# Patient Record
Sex: FEMALE | Race: ASIAN | NOT HISPANIC OR LATINO | Employment: UNEMPLOYED | URBAN - METROPOLITAN AREA
[De-identification: names, ages, dates, MRNs, and addresses within clinical notes are randomized per-mention and may not be internally consistent; named-entity substitution may affect disease eponyms.]

---

## 2017-04-13 ENCOUNTER — LAB REQUISITION (OUTPATIENT)
Dept: LAB | Facility: HOSPITAL | Age: 47
End: 2017-04-13

## 2017-04-13 ENCOUNTER — ALLSCRIPTS OFFICE VISIT (OUTPATIENT)
Dept: OTHER | Facility: OTHER | Age: 47
End: 2017-04-13

## 2017-04-13 DIAGNOSIS — Z01.419 ENCOUNTER FOR GYNECOLOGICAL EXAMINATION WITHOUT ABNORMAL FINDING: ICD-10-CM

## 2017-04-13 PROCEDURE — G0145 SCR C/V CYTO,THINLAYER,RESCR: HCPCS | Performed by: OBSTETRICS & GYNECOLOGY

## 2017-04-13 PROCEDURE — 87624 HPV HI-RISK TYP POOLED RSLT: CPT | Performed by: OBSTETRICS & GYNECOLOGY

## 2017-04-19 LAB
HPV RRNA GENITAL QL NAA+PROBE: NORMAL
LAB AP GYN PRIMARY INTERPRETATION: NORMAL
Lab: NORMAL

## 2017-05-15 ENCOUNTER — TRANSCRIBE ORDERS (OUTPATIENT)
Dept: ADMINISTRATIVE | Facility: HOSPITAL | Age: 47
End: 2017-05-15

## 2017-05-15 DIAGNOSIS — Z12.31 VISIT FOR SCREENING MAMMOGRAM: Primary | ICD-10-CM

## 2017-05-22 ENCOUNTER — HOSPITAL ENCOUNTER (OUTPATIENT)
Dept: MAMMOGRAPHY | Facility: HOSPITAL | Age: 47
Discharge: HOME | End: 2017-05-22

## 2017-05-22 DIAGNOSIS — Z01.419 ENCOUNTER FOR GYNECOLOGICAL EXAMINATION WITHOUT ABNORMAL FINDING: ICD-10-CM

## 2017-05-22 PROCEDURE — G0202 SCR MAMMO BI INCL CAD: HCPCS

## 2017-07-25 ENCOUNTER — APPOINTMENT (OUTPATIENT)
Dept: LAB | Facility: HOSPITAL | Age: 47
End: 2017-07-25
Attending: INTERNAL MEDICINE
Payer: COMMERCIAL

## 2017-07-25 ENCOUNTER — TRANSCRIBE ORDERS (OUTPATIENT)
Dept: LAB | Facility: HOSPITAL | Age: 47
End: 2017-07-25

## 2017-07-25 DIAGNOSIS — E03.9 UNSPECIFIED HYPOTHYROIDISM: Primary | ICD-10-CM

## 2017-07-25 LAB
T3FREE SERPL-MCNC: 1.73 PG/ML (ref 2.3–4.2)
T4 FREE SERPL-MCNC: 1.15 NG/DL (ref 0.76–1.46)
TSH SERPL DL<=0.05 MIU/L-ACNC: 2.37 UIU/ML (ref 0.36–3.74)

## 2017-07-25 PROCEDURE — 36415 COLL VENOUS BLD VENIPUNCTURE: CPT

## 2017-07-25 PROCEDURE — 84443 ASSAY THYROID STIM HORMONE: CPT

## 2017-07-25 PROCEDURE — 84439 ASSAY OF FREE THYROXINE: CPT

## 2017-07-25 PROCEDURE — 84481 FREE ASSAY (FT-3): CPT

## 2018-01-14 VITALS
SYSTOLIC BLOOD PRESSURE: 103 MMHG | WEIGHT: 190 LBS | BODY MASS INDEX: 32.44 KG/M2 | HEIGHT: 64 IN | DIASTOLIC BLOOD PRESSURE: 69 MMHG

## 2019-10-28 ENCOUNTER — HOSPITAL ENCOUNTER (EMERGENCY)
Facility: HOSPITAL | Age: 49
Discharge: HOME | End: 2019-10-28
Attending: EMERGENCY MEDICINE | Admitting: EMERGENCY MEDICINE

## 2019-10-28 ENCOUNTER — APPOINTMENT (EMERGENCY)
Dept: RADIOLOGY | Facility: HOSPITAL | Age: 49
End: 2019-10-28

## 2019-10-28 VITALS
HEART RATE: 60 BPM | RESPIRATION RATE: 16 BRPM | OXYGEN SATURATION: 100 % | SYSTOLIC BLOOD PRESSURE: 140 MMHG | BODY MASS INDEX: 29.47 KG/M2 | DIASTOLIC BLOOD PRESSURE: 73 MMHG | WEIGHT: 169 LBS | TEMPERATURE: 97.4 F

## 2019-10-28 DIAGNOSIS — R51.9 HEADACHE: Primary | ICD-10-CM

## 2019-10-28 PROCEDURE — 99283 EMERGENCY DEPT VISIT LOW MDM: CPT

## 2019-10-28 PROCEDURE — 70450 CT HEAD/BRAIN W/O DYE: CPT

## 2019-10-28 RX ORDER — LEVOTHYROXINE SODIUM 0.12 MG/1
125 TABLET ORAL DAILY
COMMUNITY

## 2019-10-28 RX ORDER — KETOROLAC TROMETHAMINE 30 MG/ML
15 INJECTION, SOLUTION INTRAMUSCULAR; INTRAVENOUS ONCE
Status: DISCONTINUED | OUTPATIENT
Start: 2019-10-28 | End: 2019-10-28 | Stop reason: HOSPADM

## 2019-10-28 RX ORDER — BUTALBITAL, ACETAMINOPHEN AND CAFFEINE 50; 325; 40 MG/1; MG/1; MG/1
1 TABLET ORAL EVERY 4 HOURS PRN
Qty: 12 TABLET | Refills: 0 | Status: SHIPPED | OUTPATIENT
Start: 2019-10-28

## 2019-10-28 RX ORDER — NAPROXEN 500 MG/1
500 TABLET ORAL ONCE
Status: COMPLETED | OUTPATIENT
Start: 2019-10-28 | End: 2019-10-28

## 2019-10-28 RX ORDER — METOCLOPRAMIDE 10 MG/1
10 TABLET ORAL ONCE
Status: COMPLETED | OUTPATIENT
Start: 2019-10-28 | End: 2019-10-28

## 2019-10-28 RX ORDER — DIPHENHYDRAMINE HCL 25 MG
25 TABLET ORAL ONCE
Status: COMPLETED | OUTPATIENT
Start: 2019-10-28 | End: 2019-10-28

## 2019-10-28 RX ADMIN — METOCLOPRAMIDE HYDROCHLORIDE 10 MG: 10 TABLET ORAL at 13:31

## 2019-10-28 RX ADMIN — DIPHENHYDRAMINE HCL 25 MG: 25 TABLET, COATED ORAL at 13:31

## 2019-10-28 RX ADMIN — NAPROXEN 500 MG: 500 TABLET ORAL at 13:43

## 2019-10-28 NOTE — ED PROVIDER NOTES
History  Chief Complaint   Patient presents with    Headache     headache since yesterday, worse with bright lights  no hx of headaches  denies any visual changes or nausea, c/o pressure in ears     HPI    44 year female that presents with headaches  Patient states since yesterday she has been having headaches  Mild photophobia and phonophobia  States she has had minor headaches in the before but nothing this bad  Describes it as pressure  Denies any her head  No trauma  Denies any visual changes no nausea vomiting  Denies any fevers or chills  Patient states she took Tylenol with no relief  44 year female that presents today with headaches  I offered patient to get a CT scan but she refused states she cannot states this long  She has kids at home  I discussed about why getting a CT scan to rule out any bleed versus mass etc but she refused  States she only wants p o  Medications and wants to go home  Will try migraine cocktail  Prior to Admission Medications   Prescriptions Last Dose Informant Patient Reported? Taking?   levothyroxine 125 mcg tablet 10/28/2019 at Unknown time  Yes Yes   Sig: Take 125 mcg by mouth daily      Facility-Administered Medications: None       Past Medical History:   Diagnosis Date    Disease of thyroid gland        Past Surgical History:   Procedure Laterality Date     SECTION         History reviewed  No pertinent family history  I have reviewed and agree with the history as documented  Social History     Tobacco Use    Smoking status: Never Smoker    Smokeless tobacco: Never Used   Substance Use Topics    Alcohol use: Not Currently    Drug use: Never        Review of Systems   Constitutional: Negative  Negative for diaphoresis and fever  HENT: Negative  Eyes: Positive for photophobia  Respiratory: Negative  Negative for cough, shortness of breath and wheezing  Cardiovascular: Negative    Negative for chest pain, palpitations and leg swelling  Gastrointestinal: Negative for abdominal distention, abdominal pain, nausea and vomiting  Genitourinary: Negative  Musculoskeletal: Negative  Skin: Negative  Neurological: Positive for headaches  Psychiatric/Behavioral: Negative  All other systems reviewed and are negative  Physical Exam  Physical Exam   Constitutional: She is oriented to person, place, and time  She appears well-developed and well-nourished  HENT:   Head: Normocephalic and atraumatic  Eyes: Pupils are equal, round, and reactive to light  EOM are normal    Neck: Normal range of motion  Neck supple  Cardiovascular: Normal rate, regular rhythm and normal heart sounds  No murmur heard  Pulmonary/Chest: Effort normal and breath sounds normal    Abdominal: Soft  Bowel sounds are normal  She exhibits no distension  There is no tenderness  There is no rebound and no guarding  Musculoskeletal: Normal range of motion  Neurological: She is alert and oriented to person, place, and time  Normal neuro exam   Skin: Skin is warm and dry  Psychiatric: She has a normal mood and affect  Vitals reviewed        Vital Signs  ED Triage Vitals [10/28/19 1126]   Temperature Pulse Respirations Blood Pressure SpO2   (!) 97 4 °F (36 3 °C) 60 16 140/73 100 %      Temp Source Heart Rate Source Patient Position - Orthostatic VS BP Location FiO2 (%)   Tympanic Monitor Sitting Right arm --      Pain Score       7           Vitals:    10/28/19 1126   BP: 140/73   Pulse: 60   Patient Position - Orthostatic VS: Sitting         Visual Acuity      ED Medications  Medications   ketorolac (TORADOL) injection 15 mg (has no administration in time range)   diphenhydrAMINE (BENADRYL) tablet 25 mg (has no administration in time range)   metoclopramide (REGLAN) tablet 10 mg (has no administration in time range)       Diagnostic Studies  Results Reviewed     None                 No orders to display              Procedures  Procedures       ED Course                               MDM    Disposition  Final diagnoses:   None     ED Disposition     None      Follow-up Information    None         Patient's Medications   Discharge Prescriptions    No medications on file     No discharge procedures on file      ED Provider  Electronically Signed by           Marcello Blackman MD  10/30/19 5825

## 2019-11-06 RX ORDER — BUTALBITAL, ACETAMINOPHEN AND CAFFEINE 50; 325; 40 MG/1; MG/1; MG/1
1 TABLET ORAL EVERY 4 HOURS PRN
Qty: 12 TABLET | Refills: 0 | Status: SHIPPED | OUTPATIENT
Start: 2019-11-06

## 2023-01-11 ENCOUNTER — OFFICE VISIT (OUTPATIENT)
Dept: PHYSICAL THERAPY | Facility: CLINIC | Age: 53
End: 2023-01-11

## 2023-01-11 DIAGNOSIS — M25.561 PAIN IN BOTH KNEES, UNSPECIFIED CHRONICITY: Primary | ICD-10-CM

## 2023-01-11 DIAGNOSIS — M25.562 PAIN IN BOTH KNEES, UNSPECIFIED CHRONICITY: Primary | ICD-10-CM

## 2023-01-11 NOTE — PROGRESS NOTES
PT Evaluation   Today's date: 2023  Patient name: Rashi Hebert  : 1970  MRN: [id number]  Referring provider: No ref  provider found  Dx:   Encounter Diagnosis     ICD-10-CM    1  Pain in both knees, unspecified chronicity  M25 561     M25 562             Assessment  Assessment details: Patient is a 45 y o  Female who presents to physical therapy utilizing direct access benefits with bilateral knee pain  Patient's greatest concern is worry over not knowing what's wrong and fear of not being able to keep active  Primary movement impairment diagnosis of knee pain with motor control deficits resulting in pathoanatomical symptoms of pain, restricted range of motion, muscular power deficits, and functional limitations  The aforementioned impairments have limited the patient's ability to squats, negotiate stairs, and perform gym activities at usual intensities  Due to irritability of symptoms and objective findings, there is concern for meniscal pathology in left knee  Patient tested positive for 4/5 items in meniscal cluster during eval, as noted below  Patient does have appointment scheduled with Orthopedics on 2023  Patient will benefit from PT to address possible underlying meniscal pinching and symptom irritability  Patient provided with HEP during today's session, including hip burners, SLR, and clamshells         Primary movement impairments:  1) Motor control  2) Range of motion restriction    Etiological factors include: Patient slipped approx 1 month ago        Impairments: Abnormal coordination, Abnormal gait, Abnormal muscle tone, Abnormal or restricted ROM, Activity intolerance, Impaired balance, Impaired physical strength, Lacks appropriate HEP, Poor body mechanics, Pain with function, Weight-bearing intolerance, Abnormal movement and Abnormal muscle firing  Understanding of Dx/Px/POC: Good  Prognosis: Good   Positive prognostic factors: motivation for improvement, active lifestyle   Negative prognostic factors: irritability of symptoms, comorbid conditions    Patient verbalized understanding of POC  Please contact me if you have any questions or recommendations  Thank you for the referral and the opportunity to share in [facility] care  Plan  Patient would benefit from: PT Eval and Skilled PT  Planned modality interventions: Biofeedback, Cryotherapy, TENS, Thermotherapy: Hydrocollator Packs and Traction  Planned therapy interventions: Abdominal trunk stabilization, ADL training, Balance/WB training, Body mechanics training, Coordination, Functional ROM exercises, Gait training, HEP, Joint mobilization, Manual therapy, Gonzalez taping, Motor coordination training, Neuromuscular re-education, Patient education, Strengthening, Stretching, Therapeutic activities, Therapeutic exercises and Activity modification  Frequency: 2x/wk  Duration in weeks: 4  Plan of Care beginning date: 1/11/2023  Plan of Care expiration date: 1 month - 2/11/2023  Treatment plan discussed with: Patient       Goals  Short Term Goals (2 weeks):    - Patient will be independent in basic HEP 2-3 weeks  - Patient will report >50% reduction in pain  - Patient will demonstrate >1/3 improvement in MMT grade as applicable  - Patient will achieve full knee flexion    Long Term Goals (4 weeks):  - Patient will be independent in a comprehensive home exercise program  - Patient FOTO score will improve to >77/100  - Patient will self-report >75% improvement in function  - Patient will perform >75% squat ROM   - Patient will return to usual activities without knee pain      Subjective    History of Present Illness  - Mechanism of injury: Patient reports to physical therapy after slipping about 1 month ago and injuring left knee  She did not sustain fall at the time   Current aggravating factors including crossing legs, sittingfor >5 minutes, keeping knee bent, squatting, kneeling, rising from chair  Increased pain with stairs, descending > ascending  She states she is very active, going to the gym 4 days per week  States she has been avoiding squatting and LE activities as a result of pain  She states she initially had swelling in knee, which has improved  States pain is anteriorly  Patient states she does have history of right knee pain after sustaining fall years ago, which is still bothersome  States she occasionally feels like her knee is "going out"  Pain  - Current pain ratin/10  - At best pain ratin/10  - At worst pain ratin/10    Social Support  - Stairs in house: full flight       Objective     Sensation  - Light touch: grossly intact and equal bilaterally    LE MMT  LEFT  RIGHT  -Hip Flexion:   4/5  4/5  -Hip Extension:  4/5  4/5   -Hip Abduction: 3+/5  4-/5  -Hip Adduction: 4+/5  4+/5  -Hip IR:  4/5  4/5  -Hip ER:  4-/5  4-/5    -Knee Flexion  4/5  4+/5  -Knee Extension 4-/5  4/5    Hip Range of Motion    LEFT  RIGHT  - Flexion 105  105  - IR  30  30  - ER  45  45    Knee Range of Motion    LEFT  RIGHT  - Flexion 125  135  - Extension 0  0       Palpation  LEFT  - Positive: medial joint line  - Negative: lateral joint line, patella, quad tendon, patellar tendon    RIGHT  - Positive: N/A  - Negative: anterior joint line, lateral joint line, patella, quad tendon, patellar tendon    Diagnostic Tests Performed  LEFT  - Positive:  Nidia  - Negative: Anterior drawer, posterior drawer, knee valgus at 0/30, knee varus at 0/30     RIGHT   - Positive: N/A  - Negative: Anterior drawer, posterior drawer, knee valgus, knee varus, nidia    Functional Assessment  -Functional Squat: achieves 15% of motion before onset of bilateral knee pain, L > R; mod knee valgus bilaterally with increased pronation on left compared to right    Meniscal Cluster  - Joint line tenderness: POSITIVE  - Pain with end range flexion: POSITIVE  - Pain with end range extension: NEGATIVE  - [facility]: POSITIVE  - Clicking/catching/locking: POSITIVE                 Insurance:  AMA/CMS Eval/ Re-eval POC expires [facility] #/ Referral # Total units  Start date  Expiration date Extension  Visit limitation? PT only or  PT+OT?  Co-Insurance   SELF PAY 1/11/23 2/11/23 63                                                                         Date 1/11/23        Visit Number IE        Manual         Inferomedial patella mob w/ tibial IR/ER and TKE 2x10 each                                   Neuro Re-Ed         Clamshells Rev, HEP        Bridges         Hip burners Rev, HEP        SLR/supine TKE Rev, HEP        Step ups         Lateral stepping                                             TherEx         Bike w/u         Heel slides w/ peanut                                                      TherAct         Patient education PT POC, HEP, pt edu 10 min                                            Gait Training                                    Modalities         CP               Precautions:   Past Medical History:   Diagnosis Date   • Disease of thyroid gland

## 2023-01-18 ENCOUNTER — OFFICE VISIT (OUTPATIENT)
Dept: PHYSICAL THERAPY | Facility: CLINIC | Age: 53
End: 2023-01-18

## 2023-01-18 DIAGNOSIS — M25.561 PAIN IN BOTH KNEES, UNSPECIFIED CHRONICITY: Primary | ICD-10-CM

## 2023-01-18 DIAGNOSIS — M25.562 PAIN IN BOTH KNEES, UNSPECIFIED CHRONICITY: Primary | ICD-10-CM

## 2023-01-18 NOTE — PROGRESS NOTES
Daily Note     Today's date: 2023  Patient name: Keena Cole  : 1970  MRN: [id number]  Referring provider: No ref  provider found  Dx:   Encounter Diagnosis     ICD-10-CM    1  Pain in both knees, unspecified chronicity  M25 561     M25 562           Start Time: 1100  Stop Time: 1140  Total time in clinic (min): 40 minutes    Subjective: Patient reports improvement in anteromedial knee pain following previous session, although cont to be occasionally bothersome, particularly with rolling over in bed  States she has been getting significant quad cramping with hip burners  Objective: See treatment diary below      Assessment: Tolerated treatment well  Patient with episode of left knee catching/locking after sustained knee flexion while performing SLR on right; able to unlock/restore knee extension independently although painful  Reduction in knee pain and intensity of locking noted after medial tibial HVLA  Patient with quick fatigability and tightness developing in quads with knee extension activities, indicating muscular coordination/motor control deficits  Patient with fatigue post session  Patient will continue to benefit from skilled PT to improve functional mobility and activity tolerance  Plan: Continue per plan of care  Insurance:  AMA/CMS Eval/ Re-eval POC expires [facility] #/ Referral # Total units  Start date  Expiration date Extension  Visit limitation? PT only or  PT+OT?  Co-Insurance   SELF PAY 23 63                                                                         Date 23       Visit Number IE 2       Manual         Inferomedial patella mob w/ tibial IR/ER and TKE 2x10 each          Medial tibial HVLA, x2                         Neuro Re-Ed         Clamshells Rev, HEP 3x10 each       Bridges  3x10       Hip burners Rev, HEP x15 each       SLR/supine TKE Rev, HEP 2x10 each       Step ups  6" 2x10 each       Lateral stepping Lateral step downs  6" 2x5 each                                  TherEx         Bike w/u  8 min pre       Heel slides w/ peanut  x30       LAQ  3x10 each       Hip flexor str on chair  20" x5 each                                  TherAct         Patient education PT POC, HEP, pt edu 10 min                                            Gait Training                                    Modalities         CP               Precautions:   Past Medical History:   Diagnosis Date   • Disease of thyroid gland

## 2023-01-25 ENCOUNTER — OFFICE VISIT (OUTPATIENT)
Dept: PHYSICAL THERAPY | Facility: CLINIC | Age: 53
End: 2023-01-25

## 2023-01-25 DIAGNOSIS — M25.561 PAIN IN BOTH KNEES, UNSPECIFIED CHRONICITY: Primary | ICD-10-CM

## 2023-01-25 DIAGNOSIS — M25.562 PAIN IN BOTH KNEES, UNSPECIFIED CHRONICITY: Primary | ICD-10-CM

## 2023-01-25 NOTE — PROGRESS NOTES
Daily Note     Today's date: 2023  Patient name: Jamarcus Clements  : 1970  MRN: [id number]  Referring provider: No ref  provider found  Dx:   Encounter Diagnosis     ICD-10-CM    1  Pain in both knees, unspecified chronicity  M25 561     M25 562           Start Time: 1110  Stop Time: 1143  Total time in clinic (min): 33 minutes    Subjective: Patient reports significant improvement in anteromedial knee pain on left since previous session, although she reports continued episodes of knee locking  States she cont to experience pain over inf patella with activity  Objective: See treatment diary below      Assessment: Tolerated treatment well  Patient continues to experience quick fatigue and motor control deficits with SLR and glute progressions on table  Patient with reproduction of bilateral anterior knee pain over patellae with standing progressions, particularly resisted lateral stepping, that returned to baseline with rest  There remains concern for meniscal pathology due to continued locking of left knee  Patient will continue to benefit from skilled PT to improve functional mobility and activity tolerance  Plan: Continue per plan of care  Insurance:  AMA/CMS Eval/ Re-eval POC expires [facility] #/ Referral # Total units  Start date  Expiration date Extension  Visit limitation? PT only or  PT+OT?  Co-Insurance   SELF PAY 23 63                                                                         Date 23      Visit Number IE 2 3      Manual         Inferomedial patella mob w/ tibial IR/ER and TKE 2x10 each          Medial tibial HVLA, x2                         Neuro Re-Ed         Clamshells Rev, HEP 3x10 each With medium loop, 3x10 each      Bridges  3x10 With medium loop, 3x15       Hip burners Rev, HEP x15 each       SLR/supine TKE Rev, HEP 2x10 each SLR: 3x10 each      Step ups  6" 2x10 each       Lateral stepping         Lateral step downs  6" 2x5 each       Resisted walking   Backward: 22 5# x15    Lateral: 12 5# x15 each                        TherEx         Bike w/u  8 min pre       Heel slides w/ peanut  x30       LAQ  3x10 each MRE: 2x10 each      Hip flexor str on chair  20" x5 each                                  TherAct         Patient education PT POC, HEP, pt edu 10 min                                            Gait Training                                    Modalities         CP               Precautions:   Past Medical History:   Diagnosis Date   • Disease of thyroid gland

## 2023-02-01 ENCOUNTER — APPOINTMENT (OUTPATIENT)
Dept: RADIOLOGY | Facility: CLINIC | Age: 53
End: 2023-02-01

## 2023-02-01 ENCOUNTER — OFFICE VISIT (OUTPATIENT)
Dept: OBGYN CLINIC | Facility: CLINIC | Age: 53
End: 2023-02-01

## 2023-02-01 ENCOUNTER — OFFICE VISIT (OUTPATIENT)
Dept: PHYSICAL THERAPY | Facility: CLINIC | Age: 53
End: 2023-02-01

## 2023-02-01 VITALS
WEIGHT: 189 LBS | BODY MASS INDEX: 32.27 KG/M2 | HEIGHT: 64 IN | HEART RATE: 67 BPM | SYSTOLIC BLOOD PRESSURE: 103 MMHG | DIASTOLIC BLOOD PRESSURE: 67 MMHG

## 2023-02-01 DIAGNOSIS — M25.561 PAIN IN BOTH KNEES, UNSPECIFIED CHRONICITY: Primary | ICD-10-CM

## 2023-02-01 DIAGNOSIS — M25.562 PAIN IN BOTH KNEES, UNSPECIFIED CHRONICITY: ICD-10-CM

## 2023-02-01 DIAGNOSIS — M25.561 PAIN IN BOTH KNEES, UNSPECIFIED CHRONICITY: ICD-10-CM

## 2023-02-01 DIAGNOSIS — M17.0 PRIMARY OSTEOARTHRITIS OF BOTH KNEES: Primary | ICD-10-CM

## 2023-02-01 DIAGNOSIS — M25.562 PAIN IN BOTH KNEES, UNSPECIFIED CHRONICITY: Primary | ICD-10-CM

## 2023-02-01 NOTE — PROGRESS NOTES
Daily Note     Today's date: 2023  Patient name: Cristina Salinas  : 1970  MRN: [id number]  Referring provider: No ref  provider found  Dx:   Encounter Diagnosis     ICD-10-CM    1  Pain in both knees, unspecified chronicity  M25 561     M25 562           Start Time: 1018  Stop Time: 1045  Total time in clinic (min): 27 minutes    Subjective: Patient reports ~70% improvement in knee pain and function since starting physical therapy  States that she has experienced decrease in frequency of knee locking since start of care  , although still intermittently present  States she continues to experience anterior knee pain bilaterally, particularly with squatting  Objective: See treatment diary below      Assessment: Tolerated treatment well  Patient has made gains in muscular power since starting physical therapy, although continues to demo deficits in motor control depicted by reduction in knee pain with addition of hip abductor activation  Due to continued knee locking, there is still concern for underlying meniscal pathology on left  Patient will continue to benefit from skilled PT to improve functional mobility and activity tolerance  Plan: Continue per plan of care  Insurance:  AMA/CMS Eval/ Re-eval POC expires [facility] #/ Referral # Total units  Start date  Expiration date Extension  Visit limitation? PT only or  PT+OT?  Co-Insurance   SELF PAY 23 63                                                                         Date 23     Visit Number IE 2 3 4 - foto     Manual         Inferomedial patella mob w/ tibial IR/ER and TKE 2x10 each          Medial tibial HVLA, x2                         Neuro Re-Ed         Clamshells Rev, HEP 3x10 each With medium loop, 3x10 each      Bridges  3x10 With medium loop, 3x15       Hip burners Rev, HEP x15 each       SLR/supine TKE Rev, HEP 2x10 each SLR: 3x10 each SLR: 3x10 each     Step ups  6" 2x10 each Lateral stepping         Lateral step downs  6" 2x5 each       Resisted walking   Backward: 22 5# x15    Lateral: 12 5# x15 each      SL single leg squat on total gym    Level 17, 2x15 each              TherEx         Bike w/u  8 min pre       Heel slides w/ peanut  x30       LAQ  3x10 each MRE: 2x10 each MRE: 2x10 each     Hip flexor str on chair  20" x5 each                                  TherAct         Patient education PT POC, HEP, pt edu 10 min   Pt edu, PT POC, foto 10 min                                         Gait Training                                    Modalities         CP               Precautions:   Past Medical History:   Diagnosis Date   • Disease of thyroid gland

## 2023-02-01 NOTE — PROGRESS NOTES
Assessment/Plan:  1  Primary osteoarthritis of both knees  Ambulatory Referral to Physical Therapy      2  Pain in both knees, unspecified chronicity  XR knee 3 vw left non injury    XR knee 3 vw right non injury    Ambulatory Referral to Physical Therapy        Scribe Attestation    I,:  Bakari Liao am acting as a scribe while in the presence of the attending physician :       I,:  Heaven Duncan MD personally performed the services described in this documentation    as scribed in my presence :           Emily Castrejon " Maksim Pal" on examination and review of the x-rays demonstrate severe patellofemoral compartment osteoarthritis  It is more severe on the right versus the left  It does appear that she had an old injury with a bony piece at the lateral aspect of the knee adjacent to the lateral femoral condyle I did explain that the osteoarthritis is responsible for the pain that she is experiencing anteriorly particularly with stairs or squatting activities  I do encourage her to continue to be active as well now that physical therapy at her exercise routine  However, I did encourage her to try and avoid squatting activities as this will likely exacerbate her painful symptoms  She may utilize ice after activities if she has pain and swelling  She may also utilize oral analgesics as needed  She verbalized understanding was amenable to treatment plan presented to her today  I did provide her with a new prescription to physical therapy  She may continue with activities of daily living as tolerated  I will see her back in on an as-needed basis  Subjective:   Woodrow Fernandes is a 45 y o  female who presents for evaluation of the left and right knees  She has been experiencing left knee pain for approximatly 1 month  She states that she had a slip but did not fall causing increased pain to the left knee she localizes it anteriorly    She notes that she was experiencing clicking in the knee following this mild slip  She states that she  This pain anteriorly with elevation and descending stairs intermittently  She does not experience pain with prolonged standing  Additionally, she states that she had a traumatic fall approximate 4 years ago onto the right knee  She states that she did not seek medical treatment for the knee and it did eventually resolve on its own  She states that she has had swelling and pain on the side with weightbearing activities  She has been participating in physical therapy over the past month and is happy to report symptomatic treatment  She states that the clicking has become less frequent and are nonpainful  He denies any gross instability of her knees as well  Today she denies any distal paresthesias  She does wish to consider continuing physical therapy and is not interested in steroid injections for a total knee arthroplasty  Review of Systems   Constitutional: Negative for chills, fever and unexpected weight change  HENT: Negative for hearing loss, nosebleeds and sore throat  Eyes: Negative for pain, redness and visual disturbance  Respiratory: Negative for cough, shortness of breath and wheezing  Cardiovascular: Negative for chest pain, palpitations and leg swelling  Gastrointestinal: Negative for abdominal pain, nausea and vomiting  Endocrine: Negative for polydipsia and polyuria  Genitourinary: Negative for dysuria and hematuria  Musculoskeletal: Positive for arthralgias and myalgias  See HPI   Skin: Negative for rash and wound  Neurological: Negative for dizziness, numbness and headaches  Psychiatric/Behavioral: Negative for decreased concentration and suicidal ideas  The patient is not nervous/anxious  Past Medical History:   Diagnosis Date   • Disease of thyroid gland        Past Surgical History:   Procedure Laterality Date   •  SECTION         History reviewed  No pertinent family history      Social History Occupational History   • Not on file   Tobacco Use   • Smoking status: Never   • Smokeless tobacco: Never   Vaping Use   • Vaping Use: Every day   Substance and Sexual Activity   • Alcohol use: Not Currently   • Drug use: Never   • Sexual activity: Not on file         Current Outpatient Medications:   •  levothyroxine 125 mcg tablet, Take 125 mcg by mouth daily, Disp: , Rfl:     Allergies   Allergen Reactions   • Sulfa Antibiotics      Rash, itchy       Objective:  Vitals:    02/01/23 1101   BP: 103/67   Pulse: 67       Right Knee Exam     Tenderness   Right knee tenderness location: anteromedial     Range of Motion   Extension: 0   Flexion: 120     Tests   Varus: negative Valgus: negative  Lachman:  Anterior - negative      Drawer:  Anterior - negative    Posterior - negative    Other   Erythema: absent  Scars: absent  Sensation: normal  Pulse: present  Effusion: effusion (trace) present      Left Knee Exam     Tenderness   Left knee tenderness location: anteromedial     Range of Motion   Extension: 0   Flexion: 120     Tests   Varus: negative Valgus: negative  Lachman:  Anterior - negative    Posterior - negative  Drawer:  Anterior - negative     Posterior - negative    Other   Erythema: absent  Scars: absent  Sensation: normal  Pulse: present  Effusion: no effusion present          Observations   Left Knee   Negative for effusion  Right Knee   Positive for effusion (trace)  Physical Exam  Vitals reviewed  HENT:      Head: Normocephalic and atraumatic  Eyes:      General:         Right eye: No discharge  Left eye: No discharge  Conjunctiva/sclera: Conjunctivae normal       Pupils: Pupils are equal, round, and reactive to light  Cardiovascular:      Rate and Rhythm: Normal rate  Pulmonary:      Effort: Pulmonary effort is normal  No respiratory distress  Musculoskeletal:      Cervical back: Normal range of motion and neck supple  Right knee: Effusion (trace) present  Left knee: No effusion  Comments: As noted in HPI   Skin:     General: Skin is warm and dry  Neurological:      Mental Status: She is alert and oriented to person, place, and time  I have personally reviewed pertinent films in PACS and my interpretation is as follows:    X-rays of the right knee demonstrate severe patellofemoral compartment osteoarthritis with a suspected old injury  No acute fractures demonstrated  No obvious lytic or blastic lesions demonstrated  X-rays of the left knee demonstrate severe patellofemoral compartment osteoarthritis  No acute fractures demonstrated  No obvious lytic or blastic lesions demonstrated      This document was created using speech voice recognition software  Grammatical errors, random word insertions, pronoun errors, and incomplete sentences are an occasional consequence of this system due to software limitations, ambient noise, and hardware issues  Any formal questions or concerns about content, text, or information contained within the body of this dictation should be directly addressed to the provider for clarification

## 2023-02-09 ENCOUNTER — OFFICE VISIT (OUTPATIENT)
Dept: PHYSICAL THERAPY | Facility: CLINIC | Age: 53
End: 2023-02-09

## 2023-02-09 DIAGNOSIS — M25.562 PAIN IN BOTH KNEES, UNSPECIFIED CHRONICITY: Primary | ICD-10-CM

## 2023-02-09 DIAGNOSIS — M25.561 PAIN IN BOTH KNEES, UNSPECIFIED CHRONICITY: Primary | ICD-10-CM

## 2023-02-09 NOTE — PROGRESS NOTES
Daily Note     Today's date: 2023  Patient name: Cameron Limon  : 1970  MRN: [id number]  Referring provider: No ref  provider found  Dx:   Encounter Diagnosis     ICD-10-CM    1  Pain in both knees, unspecified chronicity  M25 561     M25 562           Start Time: 935  Stop Time: 1013  Total time in clinic (min): 38 minutes    Subjective: Patient reports noticeable improvement in symptom irritability with keeping up with exercises  Continues to experience knee pain with squatting activities  Reports experiencing glute soreness following gym program on Tuesday  Objective: See treatment diary below      Assessment: Tolerated treatment well  Patient demo improving motor control and quality of quad set across reps with supine TKE  Patient able to perform partial range squats and sit <> stand transfers well without pain during today's session  Patient provided with graded vc during sit <> stands for mechanics and emphasis on eccentric control  Patient will continue to benefit from skilled PT to improve functional mobility and activity tolerance  Plan: Continue per plan of care  Insurance:  AMA/CMS Eval/ Re-eval POC expires Criss Sánchez #/ Referral # Total units  Start date  Expiration date Extension  Visit limitation? PT only or  PT+OT?  Co-Insurance   SELF PAY 23 63                                                                         Date 23    Visit Number IE 2 3 4 - foto 5    Manual         Inferomedial patella mob w/ tibial IR/ER and TKE 2x10 each          Medial tibial HVLA, x2                         Neuro Re-Ed         Clamshells Rev, HEP 3x10 each With medium loop, 3x10 each      Bridges  3x10 With medium loop, 3x15   3x10    Hip burners Rev, HEP x15 each       SLR/supine TKE Rev, HEP 2x10 each SLR: 3x10 each SLR: 3x10 each Supine TKE: 5" x20    SLR: 3x10 each    Step ups  6" 2x10 each       Lateral stepping         Lateral step downs  6" 2x5 each       Resisted walking   Backward: 22 5# x15    Lateral: 12 5# x15 each  Backward: 17 5# 2x10    SL single leg squat on total gym    Level 17, 2x15 each     Squats     TRX mini squats, 3x10    Lunges     Lateral lunges, TRX, partial range, 2x10 each    Sit <> stands     x5 w/ vc for technique             TherEx         Bike w/u  8 min pre       Heel slides w/ peanut  x30       LAQ  3x10 each MRE: 2x10 each MRE: 2x10 each     Hip flexor str on chair  20" x5 each                                  TherAct         Patient education PT POC, HEP, pt edu 10 min   Pt edu, PT POC, foto 10 min                                         Gait Training                                    Modalities         CP               Precautions:   Past Medical History:   Diagnosis Date   • Disease of thyroid gland

## 2023-02-23 ENCOUNTER — OFFICE VISIT (OUTPATIENT)
Dept: PHYSICAL THERAPY | Facility: CLINIC | Age: 53
End: 2023-02-23

## 2023-02-23 DIAGNOSIS — M25.562 PAIN IN BOTH KNEES, UNSPECIFIED CHRONICITY: Primary | ICD-10-CM

## 2023-02-23 DIAGNOSIS — M25.561 PAIN IN BOTH KNEES, UNSPECIFIED CHRONICITY: Primary | ICD-10-CM

## 2023-02-23 NOTE — PROGRESS NOTES
Discharge Note     Today's date: 2023  Patient name: Marlen Mccarthy  : 1970  MRN: [id number]  Referring provider: Wilber Crowe*  Dx:   Encounter Diagnosis     ICD-10-CM    1  Pain in both knees, unspecified chronicity  M25 561     M25 562           Start Time: 1020  Stop Time: 1058  Total time in clinic (min): 38 minutes    Subjective: Patient reports overall improvement in knee pain since start of care  She states she no longer experiences locking about left knee  Reports cont pain with ascending stairs and concentric phase of squats, although improve since start of care  States she has been attending weekly supervised gym sessions, which have been doing well      Goals  Short Term Goals (2 weeks): - ALL MET  - Patient will be independent in basic HEP 2-3 weeks  - Patient will report >50% reduction in pain  - Patient will demonstrate >1/3 improvement in MMT grade as applicable  - Patient will achieve full knee flexion    Long Term Goals (4 weeks):  - Patient will be independent in a comprehensive home exercise program - MET  - Patient FOTO score will improve to >77/100 - MET  - Patient will self-report >75% improvement in function - MET  - Patient will perform >75% squat ROM - PARTIALLY MET  - Patient will return to usual activities without knee pain - PARTIALLY MET      Objective: See treatment diary below    LE MMT  LEFT    RIGHT  -Hip Flexion:   4/5 (23: 4+/5)  4/5 (23: 4+/5)  -Hip Extension:  4/5 (23: 4/5)  4/5 (23: 4/5)   -Hip Abduction: 3+/5 (23: 4/5)  4-/5 (23: 4/5)  -Hip Adduction: 4+/5 (23: 5/5)  4+/5 (23: 5/5)  -Hip IR:  4/5 (23: 4/5)  4/5 (23: 4/5)  -Hip ER:  4-/5 (23: 4/5)  4-/5 (23: 4/5)    -Knee Flexion  4/5 (23: 4/5)  4+/5 (23: 4/5)  -Knee Extension 4-/5 (23: 4/5)  4/5 (23: 4/5)    Hip Range of Motion    LEFT  RIGHT  - Flexion 105  105  - IR  30  30  - ER  45  45    Knee Range of Motion    LEFT    RIGHT  - Flexion 125 (2/23/23: 130)  135 (2/23/23: 135)  - Extension 0  0       Palpation  LEFT  - Positive: medial joint line  - Negative: lateral joint line, patella, quad tendon, patellar tendon  (2/23/23: negative for tenderness at medial joint line)    RIGHT  - Positive: N/A  - Negative: anterior joint line, lateral joint line, patella, quad tendon, patellar tendon  (2/23/23: grossly similar)    Diagnostic Tests Performed  LEFT  - Positive: Nidia  - Negative: Anterior drawer, posterior drawer, knee valgus at 0/30, knee varus at 0/30  (2/23/23: negative)     RIGHT   - Positive: N/A  - Negative: Anterior drawer, posterior drawer, knee valgus, knee varus, nidia  (2/23/23: negative)    Functional Assessment  -Functional Squat: achieves 15% of motion before onset of bilateral knee pain, L > R; mod knee valgus bilaterally with increased pronation on left compared to right  (2/23/23: able to achieve >50% of motion before onset of anterior knee pain)    Meniscal Cluster  - Joint line tenderness: POSITIVE (2/23/23: negative)  - Pain with end range flexion: POSITIVE (2/23/23: negative)  - Pain with end range extension: NEGATIVE (2/23/23: negative)  - Malina Gulling: POSITIVE (8/67/39: negative)  - Clicking/catching/locking: POSITIVE (2/23/23: intermittent)      Assessment: Patient has made gains in strength, range of motion, and functional mobility since starting physical therapy  She has demonstrated improved tolerance to prolonged ambulation and stair negotiation, as well as improved motor control with the aforementioned functional activities  Despite these gains, the patient continues to experience discomfort with squatting >50% of motion  As the patient has progressed towards previously set goals and is participating in supervised gym program, she is being discharged from physical therapy episode  Laila SULLIVAN was updated and reviewed during today's session and patient expressed no questions/concerns with discharge  Plan: Discharge from physical therapy episode in favor of supervised gym program          Insurance:  AMA/CMS Eval/ Re-eval POC expires Chelsea Vega #/ Referral # Total units  Start date  Expiration date Extension  Visit limitation? PT only or  PT+OT?  Co-Insurance   SELF PAY 1/11/23 2/11/23 63                                                                         Date 1/11/23 1/18/23 1/25/23 2/1/23 2/9/23 2/23/23   Visit Number IE 2 3 4 - foto 5 6   Manual         Inferomedial patella mob w/ tibial IR/ER and TKE 2x10 each     Patellar mobs, all planes, bilateral 5 min total     Medial tibial HVLA, x2                         Neuro Re-Ed         Clamshells Rev, HEP 3x10 each With medium loop, 3x10 each      Bridges  3x10 With medium loop, 3x15   3x10 3x10   Hip burners Rev, HEP x15 each    x15 each   SLR/supine TKE Rev, HEP 2x10 each SLR: 3x10 each SLR: 3x10 each Supine TKE: 5" x20    SLR: 3x10 each SLR: 3x10 each   Step ups  6" 2x10 each       Lateral stepping         Lateral step downs  6" 2x5 each    Backward step downs: 8" 2x10 each   Resisted walking   Backward: 22 5# x15    Lateral: 12 5# x15 each  Backward: 17 5# 2x10    SL single leg squat on total gym    Level 17, 2x15 each     Squats     TRX mini squats, 3x10 TRX mini squats, 3x10   Lunges     Lateral lunges, TRX, partial range, 2x10 each Lateral lunges, TRX, partial range, 3x10 each   Sit <> stands     x5 w/ vc for technique             TherEx         Bike w/u  8 min pre       Heel slides w/ peanut  x30       LAQ  3x10 each MRE: 2x10 each MRE: 2x10 each     Hip flexor str on chair  20" x5 each       Leg press      95# bilat concentric, unilat eccentric w/ 10" iso hold at 45 deg, x10 each                     TherAct         Patient education PT POC, HEP, pt edu 10 min   Pt edu, PT POC, foto 10 min                                         Gait Training                                    Modalities         CP               Precautions:   Past Medical History:   Diagnosis Date   • Disease of thyroid gland

## 2023-09-20 ENCOUNTER — TELEPHONE (OUTPATIENT)
Dept: FAMILY MEDICINE CLINIC | Facility: CLINIC | Age: 53
End: 2023-09-20

## 2023-09-20 NOTE — TELEPHONE ENCOUNTER
VM on appt line:    Hi, this is Radha. I want, I would like to get an appointment. Can you please call me back on my number, [phone]? I repeat, my number is [phone]. Thank you. Have a great day. This message was addressed - schedule new pt appt.

## 2023-11-09 ENCOUNTER — OFFICE VISIT (OUTPATIENT)
Dept: FAMILY MEDICINE CLINIC | Facility: CLINIC | Age: 53
End: 2023-11-09

## 2023-11-09 VITALS
HEART RATE: 65 BPM | OXYGEN SATURATION: 98 % | BODY MASS INDEX: 33.3 KG/M2 | SYSTOLIC BLOOD PRESSURE: 122 MMHG | WEIGHT: 191 LBS | RESPIRATION RATE: 16 BRPM | DIASTOLIC BLOOD PRESSURE: 58 MMHG

## 2023-11-09 DIAGNOSIS — Z12.4 CERVICAL CANCER SCREENING: ICD-10-CM

## 2023-11-09 DIAGNOSIS — Z12.31 ENCOUNTER FOR SCREENING MAMMOGRAM FOR MALIGNANT NEOPLASM OF BREAST: ICD-10-CM

## 2023-11-09 DIAGNOSIS — Z01.419 ENCOUNTER FOR ANNUAL ROUTINE GYNECOLOGICAL EXAMINATION: Primary | ICD-10-CM

## 2023-11-09 DIAGNOSIS — E66.09 CLASS 1 OBESITY DUE TO EXCESS CALORIES WITHOUT SERIOUS COMORBIDITY WITH BODY MASS INDEX (BMI) OF 33.0 TO 33.9 IN ADULT: ICD-10-CM

## 2023-11-09 DIAGNOSIS — E07.9 THYROID DISORDER: ICD-10-CM

## 2023-11-09 PROCEDURE — G0476 HPV COMBO ASSAY CA SCREEN: HCPCS

## 2023-11-09 PROCEDURE — G0145 SCR C/V CYTO,THINLAYER,RESCR: HCPCS

## 2023-11-09 PROCEDURE — 99396 PREV VISIT EST AGE 40-64: CPT | Performed by: FAMILY MEDICINE

## 2023-11-09 NOTE — PROGRESS NOTES
Subjective      Chinedu Merlos is a 52 y.o. female who presents for gyn exam with pap, CEED program.   Would also like routine labs, TSH rechecked. GYN:  Denies vaginal discharge, labial erythema or lesions, dyspareunia, intermenstrual bleeding, spotting, or discharge. Menarche: 14. Menopause: 48  Contraception: N/a  Postmenopausal bleeding? Denies  Patient is less sexually active with partner. Prior gynecologic surgeries: Denies    OB:   female  Pregnancies were twins, . :  Denies dysuria, urinary frequency or urgency, hematuria, flank pain, incontinence. Breast:  Denies breast mass, skin changes, dimpling, reddening, nipple retraction, breast discharge. Patient denies family history of breast, endometrial, or ovarian ca. Lifestyle:  Diet/Nutrition: well balanced diet. Exercise: walking. Screening/Preventative:  Cervical cancer: last pap smear in 2017. Results were neg  Breast cancer: No mammogram. Ordered today. Colon cancer: N/A  Osteoporosis: Not indicated    Review of Systems  Review of Systems   Constitutional:  Negative for fever. Cardiovascular:  Negative for chest pain. Gastrointestinal:  Negative for abdominal pain. Genitourinary:  Negative for difficulty urinating, hematuria, vaginal bleeding and vaginal discharge. Neurological:  Negative for headaches. All other systems reviewed and are negative. Objective      /58   Pulse 65   Resp 16   Wt 86.6 kg (191 lb)   SpO2 98%   BMI 33.30 kg/m²     Physical Exam  Vitals reviewed. Exam conducted with a chaperone present. Constitutional:       Appearance: Normal appearance. HENT:      Mouth/Throat:      Mouth: Mucous membranes are moist.   Cardiovascular:      Rate and Rhythm: Normal rate and regular rhythm. Pulses: Normal pulses. Heart sounds: Normal heart sounds. No murmur heard. Pulmonary:      Effort: No respiratory distress. Breath sounds: Normal breath sounds.  No wheezing. Chest:   Breasts:     Right: No swelling, nipple discharge or tenderness. Left: No swelling, nipple discharge or tenderness. Genitourinary:     General: Normal vulva. Exam position: Lithotomy position. Labia:         Right: No rash or lesion. Left: No rash or lesion. Vagina: No erythema, bleeding or lesions. Cervix: No erythema or cervical bleeding. Discharge: Some physiologic discharge. Uterus: Not tender. Adnexa:         Right: No mass or tenderness. Left: No mass or tenderness. Musculoskeletal:         General: No tenderness. Neurological:      Mental Status: She is alert. Psychiatric:         Mood and Affect: Mood normal.          Assessment & Plan:    1. Encounter for annual routine gynecological examination  - Pap and HPV performed  - Breast exam performed.  Mammogram ordered  - Discussed self breast awareness monthly  - Discussed preventive care, exercise, balanced diet    Problem List Items Addressed This Visit       Thyroid disorder    Relevant Orders    TSH, 3rd generation with Free T4 reflex     Other Visit Diagnoses       Encounter for annual routine gynecological examination    -  Primary    Class 1 obesity due to excess calories without serious comorbidity with body mass index (BMI) of 33.0 to 33.9 in adult        Relevant Orders    CBC and differential    Comprehensive metabolic panel    HEMOGLOBIN A1C W/ EAG ESTIMATION    Lipid Panel with Direct LDL reflex    Encounter for screening mammogram for malignant neoplasm of breast        Relevant Orders    Mammo screening bilateral w 3d & cad    Cervical cancer screening        Relevant Orders    Liquid-based pap, screening

## 2023-11-10 LAB
HPV HR 12 DNA CVX QL NAA+PROBE: NEGATIVE
HPV16 DNA CVX QL NAA+PROBE: NEGATIVE
HPV18 DNA CVX QL NAA+PROBE: NEGATIVE

## 2023-11-17 LAB
LAB AP GYN PRIMARY INTERPRETATION: NORMAL
Lab: NORMAL

## 2023-11-29 ENCOUNTER — TELEPHONE (OUTPATIENT)
Dept: FAMILY MEDICINE CLINIC | Facility: CLINIC | Age: 53
End: 2023-11-29

## 2023-11-29 NOTE — TELEPHONE ENCOUNTER
Fax received from [address] [place] [address],[facility] 6100 into encounter. Faxed completed form.      Placed in "to be scanned" bin

## 2023-12-01 LAB — HBA1C MFR BLD HPLC: 5.6 %

## 2023-12-04 ENCOUNTER — TELEPHONE (OUTPATIENT)
Dept: FAMILY MEDICINE CLINIC | Facility: CLINIC | Age: 53
End: 2023-12-04

## 2023-12-08 NOTE — TELEPHONE ENCOUNTER
Vm on clinical line:     Hi, this is Radha. My YOB: 1970. I am waiting for the doctor call, Doctor Rebecca Francois call for my blood work report and also the medical medication prescription. Can you please call me back on [phone]? I repeat, can you please call me back on [phone]? Thank you. OK. I.    Called to confirm what labs as last labs completed were in Nov. Called pt. She said she completed lab work about 8 days ago. Confirmed she went to [facility]. She has hard copy of results. Advised her to drop them off so we can scan them to system.

## 2023-12-13 ENCOUNTER — TELEPHONE (OUTPATIENT)
Dept: FAMILY MEDICINE CLINIC | Facility: CLINIC | Age: 53
End: 2023-12-13

## 2023-12-13 NOTE — TELEPHONE ENCOUNTER
Received Labs from patient for review. Scanned copy into encounter. Routing to Dr. Hany Rubio for review.

## 2023-12-19 NOTE — TELEPHONE ENCOUNTER
Emeterio on clinical line:     Hi, this is Radha Lapse. My YOB: 1970 and the doctor I'm trying to reach to Doctor Obey. I submitted my reports, blood work report and still I'm waiting for the doctor call and the prescription. Please call me on [phone]. Please call me on [phone]. Thank you.      Dr Aguilar- can you please review the labs and call pt to discuss?

## 2023-12-20 NOTE — TELEPHONE ENCOUNTER
Emeterio on clinical line:     Hi, this is Radha Hernandez. My date of birth is [birth year] and I left my blood work report last Friday at the  and still I'm waiting for the doctor call for the medication prescription and go through my test result. Please call me on [phone]. I repeat, my number is [phone].    Can someone covering Dr Aguilar's in basket please review the labs scanned into this encounter & call pt to discuss? Thanks!

## 2023-12-21 DIAGNOSIS — E07.9 THYROID DISORDER: Primary | ICD-10-CM

## 2023-12-21 RX ORDER — LEVOTHYROXINE SODIUM 137 UG/1
137 TABLET ORAL DAILY
Qty: 90 TABLET | Refills: 1 | Status: SHIPPED | OUTPATIENT
Start: 2023-12-21

## 2024-03-06 ENCOUNTER — TELEPHONE (OUTPATIENT)
Age: 54
End: 2024-03-06

## 2024-03-06 NOTE — TELEPHONE ENCOUNTER
VM on appt line:    Hi, my name is Radha Vaz and I have appointment for 15th March. I just, I'm just looking for a if I get any reschedule my appointment, can you please call me back on [phone]? I repeat, please call me back on [phone]. Thank you. Have a great day.    Spoke with pt - rescheduled appt

## 2024-03-22 ENCOUNTER — OFFICE VISIT (OUTPATIENT)
Age: 54
End: 2024-03-22

## 2024-03-22 VITALS
HEART RATE: 69 BPM | SYSTOLIC BLOOD PRESSURE: 130 MMHG | BODY MASS INDEX: 34.87 KG/M2 | OXYGEN SATURATION: 99 % | TEMPERATURE: 98.2 F | DIASTOLIC BLOOD PRESSURE: 62 MMHG | WEIGHT: 200 LBS

## 2024-03-22 DIAGNOSIS — E07.9 THYROID DISORDER: Primary | ICD-10-CM

## 2024-03-22 PROCEDURE — 99213 OFFICE O/P EST LOW 20 MIN: CPT | Performed by: FAMILY MEDICINE

## 2024-03-22 RX ORDER — LEVOTHYROXINE SODIUM 0.12 MG/1
137 TABLET ORAL DAILY
Qty: 90 TABLET | Refills: 0 | Status: SHIPPED | OUTPATIENT
Start: 2024-03-22 | End: 2024-06-20

## 2024-03-22 NOTE — PROGRESS NOTES
[facility] Office visit    Assessment/Plan:     1. Thyroid disorder  Assessment & Plan:  Outside labs on 12/1/23  - TSH 9.05, Free T4 0.73  - Was started on Levothyroxine at this time    Since last visit  - Patient reports she is doing a scientific pranayama (breathing exercise) to help with the thyroid  - Gets bloodwork every 15 days, on her own accord, self pay  - Symptoms include weight gain, no constipation, temperature imbalance, fatigue, chest pain, SOB. Advised of lifestyle modifications.    Outside records reviewed (patient brought)  - 2/10: TSH 0.42   - 3/2: TSH 18.07 (after self discontinuing Levothyroxine)  - 3/21 TSH 0.83 (after restarting Levothyroxine)    Plan:  - Currently on Levothyroxine 137 mcg daily  - Shared decision making performed, will reduce dose to 125 mcg given lower end of normal and recheck in 6 weeks. Instructed not to self discontinue, and advised of taper process  - Self pay, patient has bloodwork done at [facility].  - Advised to drop off a copy of the results for further management        Orders:  -     levothyroxine (Euthyrox) 125 mcg tablet; Take 1 tablet (125 mcg total) by mouth daily       Subjective:   LYDIA Duran is a 53 y.o. female who presents for follow up of abnormal TSH. Patient reports since her last visit she started a scientific pranayama, holistic breathing exercise aimed to treat hypothyroidism. She reports her TSH was low and then she self discontinued her Levothyroxine. She reports weight gain, but denies constipation, heat/cold intolerance, fatigue, chest pain, shortness of breath, abdominal pain. States she would like to eventually get off the medication and will continue bloodwork at outside lab.      Review of Systems   Constitutional:  Negative for fatigue and fever.        Weight gain   HENT:  Negative for sore throat.    Eyes:  Negative for visual disturbance.   Respiratory:  Negative for cough and shortness of breath.     Cardiovascular:  Negative for chest pain and palpitations.   Gastrointestinal:  Negative for abdominal pain, constipation, diarrhea and nausea.   Genitourinary:  Negative for dysuria and hematuria.   Musculoskeletal:  Negative for back pain.   Skin:  Negative for rash.   Neurological:  Negative for dizziness.   All other systems reviewed and are negative.       Objective:     /62 (BP Location: Left arm, Patient Position: Sitting, Cuff Size: Large)   Pulse 69   Temp 98.2 °F (36.8 °C) (Tympanic)   Wt 90.7 kg (200 lb)   SpO2 99%   BMI 34.87 kg/m²      Physical Exam  Vitals reviewed.   Constitutional:       General: She is not in acute distress.     Appearance: Normal appearance.   HENT:      Head: Normocephalic.      Mouth/Throat:      Mouth: Mucous membranes are moist.   Eyes:      Extraocular Movements: Extraocular movements intact.   Cardiovascular:      Rate and Rhythm: Normal rate and regular rhythm.      Pulses: Normal pulses.      Heart sounds: Normal heart sounds. No murmur heard.  Pulmonary:      Effort: No respiratory distress.      Breath sounds: Normal breath sounds. No wheezing.   Abdominal:      General: Abdomen is flat. There is no distension.      Palpations: Abdomen is soft.      Tenderness: There is no abdominal tenderness. There is no guarding.   Musculoskeletal:         General: No swelling or tenderness.   Skin:     General: Skin is warm and dry.   Neurological:      Mental Status: She is alert.      Motor: No weakness.      Gait: Gait normal.   Psychiatric:         Mood and Affect: Mood normal.          ** Please Note: This note has been constructed using a voice recognition system **     Guillermo Aguilar MD  03/22/24  3:01 PM

## 2024-03-22 NOTE — ASSESSMENT & PLAN NOTE
Outside labs on 12/1/23  - TSH 9.05, Free T4 0.73  - Was started on Levothyroxine at this time    Since last visit  - Patient reports she is doing a scientific pranayama (breathing exercise) to help with the thyroid  - Gets bloodwork every 15 days, on her own accord, self pay  - Symptoms include weight gain, no constipation, temperature imbalance, fatigue, chest pain, SOB. Advised of lifestyle modifications.    Outside records reviewed (patient brought)  - 2/10: TSH 0.42   - 3/2: TSH 18.07 (after self discontinuing Levothyroxine)  - 3/21 TSH 0.83 (after restarting Levothyroxine)    Plan:  - Currently on Levothyroxine 137 mcg daily  - Shared decision making performed, will reduce dose to 125 mcg given lower end of normal and recheck in 6 weeks. Instructed not to self discontinue, and advised of taper process  - Self pay, patient has bloodwork done at [facility].  - Advised to drop off a copy of the results for further management

## 2024-06-27 ENCOUNTER — TELEPHONE (OUTPATIENT)
Age: 54
End: 2024-06-27

## 2024-06-27 DIAGNOSIS — E07.9 THYROID DISORDER: ICD-10-CM

## 2024-07-01 RX ORDER — LEVOTHYROXINE SODIUM 112 UG/1
112 TABLET ORAL DAILY
Qty: 90 TABLET | Refills: 0 | Status: SHIPPED | OUTPATIENT
Start: 2024-07-01 | End: 2024-09-29

## 2024-07-05 DIAGNOSIS — E07.9 THYROID DISORDER: ICD-10-CM

## 2024-07-08 RX ORDER — LEVOTHYROXINE SODIUM 112 UG/1
112 TABLET ORAL DAILY
Qty: 90 TABLET | Refills: 0 | OUTPATIENT
Start: 2024-07-08 | End: 2024-10-06

## 2024-07-08 NOTE — TELEPHONE ENCOUNTER
Patient is calling to check the status of Rx as she has been out of medication x 2 days.    Routing to In office Blue Team Members

## 2024-09-11 ENCOUNTER — TELEPHONE (OUTPATIENT)
Age: 54
End: 2024-09-11

## 2024-09-11 NOTE — TELEPHONE ENCOUNTER
Patient dropped off [facility] results  Scanned into encounter for review    Patient is currently taking levothyroxine 112.5mg, however she is requesting it to be lowered to 75 mg.     Dr. Pruitt can you review?

## 2024-09-17 NOTE — TELEPHONE ENCOUNTER
Spoke with patient - I advised her of what the doctor stated.     Patient is requesting a call back from the doctor. Dr. Pruitt, can you reach out to the patient please?

## 2024-10-14 DIAGNOSIS — E07.9 THYROID DISORDER: ICD-10-CM

## 2024-10-14 RX ORDER — LEVOTHYROXINE SODIUM 100 UG/1
100 TABLET ORAL DAILY
Qty: 90 TABLET | Refills: 0 | Status: SHIPPED | OUTPATIENT
Start: 2024-10-14 | End: 2025-01-12

## 2025-01-16 DIAGNOSIS — E07.9 THYROID DISORDER: ICD-10-CM

## 2025-01-16 RX ORDER — LEVOTHYROXINE SODIUM 100 UG/1
100 TABLET ORAL DAILY
Qty: 90 TABLET | Refills: 0 | Status: SHIPPED | OUTPATIENT
Start: 2025-01-16 | End: 2025-04-16

## 2025-01-16 NOTE — TELEPHONE ENCOUNTER
KENNETH left on Rx line:    Hi good morning, my name is Radha Duran and my YOB: 1970 and I am requesting for the levothyroxine. It's a levothoraxin 100MG medicine I requested for three months and I have a few laps so if I get it by Monday that will be helpful for me. And [facility] is my pharmacy if you want to reach. My number is [phone]. I repeat my number is [phone]. Thank you. Have a great day.

## 2025-04-11 ENCOUNTER — TELEPHONE (OUTPATIENT)
Age: 55
End: 2025-04-11

## 2025-04-11 DIAGNOSIS — E07.9 THYROID DISORDER: ICD-10-CM

## 2025-04-11 RX ORDER — LEVOTHYROXINE SODIUM 100 UG/1
100 TABLET ORAL DAILY
Qty: 90 TABLET | Refills: 0 | Status: SHIPPED | OUTPATIENT
Start: 2025-04-11 | End: 2025-04-16

## 2025-04-11 NOTE — TELEPHONE ENCOUNTER
Patient brought in lab results from [facility]  Scanned into encounter for review     Patient is requesting medication to be sent to [facility]

## 2025-04-16 ENCOUNTER — TELEPHONE (OUTPATIENT)
Age: 55
End: 2025-04-16

## 2025-04-16 DIAGNOSIS — E07.9 THYROID DISORDER: Primary | ICD-10-CM

## 2025-04-16 RX ORDER — LEVOTHYROXINE SODIUM 88 UG/1
88 TABLET ORAL DAILY
Qty: 60 TABLET | Refills: 1 | Status: SHIPPED | OUTPATIENT
Start: 2025-04-16

## 2025-04-16 NOTE — TELEPHONE ENCOUNTER
Patient is calling to talk with Dr. Pruitt about his thyroid medication.    Dr. Pruitt, can you please call patient sometime today at [phone]? Thank you!

## 2025-04-18 ENCOUNTER — TELEPHONE (OUTPATIENT)
Age: 55
End: 2025-04-18

## 2025-07-10 ENCOUNTER — TELEPHONE (OUTPATIENT)
Age: 55
End: 2025-07-10

## 2025-07-10 NOTE — TELEPHONE ENCOUNTER
Martha Pruitt,     This patient came in requesting a decrease in her levothyroxine dosage. I told her she may need to schedule an appointment    Please advise.    Thank you!

## 2025-07-14 ENCOUNTER — TELEMEDICINE (OUTPATIENT)
Age: 55
End: 2025-07-14

## 2025-07-14 DIAGNOSIS — E07.9 THYROID DISORDER: Primary | ICD-10-CM

## 2025-07-14 DIAGNOSIS — Z59.71 DOES NOT HAVE HEALTH INSURANCE: ICD-10-CM

## 2025-07-14 PROCEDURE — 99213 OFFICE O/P EST LOW 20 MIN: CPT | Performed by: FAMILY MEDICINE

## 2025-07-14 RX ORDER — LEVOTHYROXINE SODIUM 75 UG/1
75 TABLET ORAL
Qty: 90 TABLET | Refills: 1 | Status: SHIPPED | OUTPATIENT
Start: 2025-07-14

## 2025-07-14 NOTE — PROGRESS NOTES
Virtual Brief Visit  Name: Cristy Duran      : 1970      MRN: [id number]  Encounter Provider: Yann Pruitt MD  Encounter Date: 2025   Encounter department: [facility]  :  Assessment & Plan  Thyroid disorder  Outside labs on 23  - TSH 9.05, Free T4 0.73  - Was started on Levothyroxine at this time     Since last visit  - Patient reports she is doing a scientific pranayama (breathing exercise) to help with the thyroid  - Gets bloodwork every 15 days, on her own accord, self pay  - Symptoms include weight gain, no constipation, temperature imbalance, fatigue, chest pain, SOB. Advised of lifestyle modifications.     Outside records reviewed (in media)  - 2/10: TSH 0.42   - 3/2: TSH 18.07 (after self discontinuing Levothyroxine)  - 3/21 TSH 0.83 (after restarting Levothyroxine)  - 24: TSH 1.21 ( levothyroxine 100 mcg)  - 25 TSH 1.35 ( decreased levothyroxine to 88mcg)  - 7/10/25 TSH  1.68      Plan:  - Currently on Levothyroxine 88 mcg daily  - Shared decision making performed, will reduce dose to 75mcg upon patient's request  It was explained that her TSH has slightly increased and her doasing should stay at 100 mcg, but patient requested lower dosing to eventually come off of it  - It was explained to the patient that she would be needing to be seeen in the office atleast once a year to be examined in person, she has reservations due to being self pay   - Instructed not to self discontinue, and advised of taper process  - Self pay, patient has bloodwork done at [facility].  - Will continue to follow    Orders:    levothyroxine 75 mcg tablet; Take 1 tablet (75 mcg total) by mouth daily in the early morning    Does not have health insurance    Orders:    Ambulatory Referral to Financial Counseling Program; Future        History of Present Illness   55 year old patient with pas medical history of hypothyroidism had a televiist appointment today to  "adjust medication dosage        Administrative Statements   Encounter provider Yann Pruitt MD    The Patient is located at Home and in the following state in which I hold an active license [place].    The patient was identified by name and date of birth. Cristy Duran was informed that this is a telemedicine visit and that the visit is being conducted through Telephone.  My office door was closed. No one else was in the room.  She acknowledged consent and understanding of privacy and security of the video platform. The patient has agreed to participate and understands they can discontinue the visit at any time.    I have spent a total time of 10 minutes in caring for this patient on the day of the visit/encounter including Diagnostic results, Prognosis, Risks and benefits of tx options, Instructions for management, Importance of tx compliance, Risk factor reductions, Documenting in the medical record, Reviewing/placing orders in the medical record (including tests, medications, and/or procedures), and Obtaining or reviewing history  , not including the time spent for establishing the audio/video connection.      Portions of the record may have been created with voice recognition software. Occasional wrong word or \"sound a like\" substitutions may have occurred due to the inherent limitations of voice recognition software. Read the chart carefully and recognize, using context, where substitutions have occurred.If you have any questions, please contact the dictating provider.       Yann Pruitt MD  PGY2 Family Medicine Resident  [facility]          "

## 2025-07-20 PROBLEM — E07.9 THYROID DISORDER: Status: ACTIVE | Noted: 2025-07-20

## 2025-07-20 NOTE — ASSESSMENT & PLAN NOTE
Outside labs on 12/1/23  - TSH 9.05, Free T4 0.73  - Was started on Levothyroxine at this time     Since last visit  - Patient reports she is doing a scientific pranayama (breathing exercise) to help with the thyroid  - Gets bloodwork every 15 days, on her own accord, self pay  - Symptoms include weight gain, no constipation, temperature imbalance, fatigue, chest pain, SOB. Advised of lifestyle modifications.     Outside records reviewed (in media)  - 2/10: TSH 0.42   - 3/2: TSH 18.07 (after self discontinuing Levothyroxine)  - 3/21 TSH 0.83 (after restarting Levothyroxine)  - 9/11/24: TSH 1.21 ( levothyroxine 100 mcg)  - 4/11/25 TSH 1.35 ( decreased levothyroxine to 88mcg)  - 7/10/25 TSH  1.68      Plan:  - Currently on Levothyroxine 88 mcg daily  - Shared decision making performed, will reduce dose to 75mcg upon patient's request  It was explained that her TSH has slightly increased and her doasing should stay at 100 mcg, but patient requested lower dosing to eventually come off of it  - It was explained to the patient that she would be needing to be seeen in the office atleast once a year to be examined in person, she has reservations due to being self pay   - Instructed not to self discontinue, and advised of taper process  - Self pay, patient has bloodwork done at [facility].  - Will continue to follow    Orders:    levothyroxine 75 mcg tablet; Take 1 tablet (75 mcg total) by mouth daily in the early morning

## 2025-08-07 ENCOUNTER — TELEPHONE (OUTPATIENT)
Age: 55
End: 2025-08-07